# Patient Record
Sex: MALE | Race: WHITE | NOT HISPANIC OR LATINO | Employment: OTHER | URBAN - METROPOLITAN AREA
[De-identification: names, ages, dates, MRNs, and addresses within clinical notes are randomized per-mention and may not be internally consistent; named-entity substitution may affect disease eponyms.]

---

## 2021-10-29 LAB
BUN SERPL-MCNC: 27 MG/DL (ref 7–18)
CHOLEST SERPL-MCNC: 124 MG/DL
CREAT SERPL-MCNC: 1.56 MG/DL (ref 0.6–1.3)
GLUCOSE SERPL-MCNC: 104 MG/DL (ref 74–106)
HDLC SERPL-MCNC: 40 MG/DL (ref 35–60)
LDLC SERPL CALC-MCNC: 63 MG/DL
TRIGL SERPL-MCNC: 128 MG/DL (ref 30–150)

## 2022-04-07 ENCOUNTER — HISTORICAL (OUTPATIENT)
Dept: ADMINISTRATIVE | Facility: HOSPITAL | Age: 87
End: 2022-04-07
Payer: MEDICARE

## 2022-04-23 VITALS
OXYGEN SATURATION: 94 % | BODY MASS INDEX: 41.35 KG/M2 | SYSTOLIC BLOOD PRESSURE: 144 MMHG | WEIGHT: 263.44 LBS | HEIGHT: 67 IN | DIASTOLIC BLOOD PRESSURE: 75 MMHG

## 2022-05-03 ENCOUNTER — OFFICE VISIT (OUTPATIENT)
Dept: PRIMARY CARE CLINIC | Facility: CLINIC | Age: 87
End: 2022-05-03
Payer: MEDICARE

## 2022-05-03 VITALS
HEART RATE: 57 BPM | BODY MASS INDEX: 41.07 KG/M2 | OXYGEN SATURATION: 93 % | SYSTOLIC BLOOD PRESSURE: 168 MMHG | WEIGHT: 261.69 LBS | DIASTOLIC BLOOD PRESSURE: 74 MMHG

## 2022-05-03 DIAGNOSIS — E78.00 HYPERCHOLESTEROLEMIA: ICD-10-CM

## 2022-05-03 DIAGNOSIS — I10 HYPERTENSION, UNSPECIFIED TYPE: ICD-10-CM

## 2022-05-03 DIAGNOSIS — I48.20 CHRONIC ATRIAL FIBRILLATION: ICD-10-CM

## 2022-05-03 DIAGNOSIS — Z00.00 WELLNESS EXAMINATION: Primary | ICD-10-CM

## 2022-05-03 DIAGNOSIS — J30.89 NON-SEASONAL ALLERGIC RHINITIS, UNSPECIFIED TRIGGER: ICD-10-CM

## 2022-05-03 DIAGNOSIS — E87.6 HYPOKALEMIA: ICD-10-CM

## 2022-05-03 PROBLEM — M19.071 OSTEOARTHRITIS OF RIGHT ANKLE: Status: ACTIVE | Noted: 2022-05-03

## 2022-05-03 PROBLEM — E66.9 OBESITY: Status: ACTIVE | Noted: 2022-05-03

## 2022-05-03 PROBLEM — Z79.01 ON ANTICOAGULANT THERAPY: Status: ACTIVE | Noted: 2022-05-03

## 2022-05-03 PROBLEM — N40.0 BENIGN PROSTATIC HYPERPLASIA: Status: ACTIVE | Noted: 2022-05-03

## 2022-05-03 PROBLEM — M10.9 GOUT: Status: ACTIVE | Noted: 2022-05-03

## 2022-05-03 PROBLEM — I48.91 ATRIAL FIBRILLATION: Status: ACTIVE | Noted: 2022-05-03

## 2022-05-03 PROCEDURE — 99214 PR OFFICE/OUTPT VISIT, EST, LEVL IV, 30-39 MIN: ICD-10-PCS | Mod: ,,, | Performed by: FAMILY MEDICINE

## 2022-05-03 PROCEDURE — 99214 OFFICE O/P EST MOD 30 MIN: CPT | Mod: ,,, | Performed by: FAMILY MEDICINE

## 2022-05-03 RX ORDER — AMLODIPINE BESYLATE 5 MG/1
1 TABLET ORAL DAILY
COMMUNITY
Start: 2021-09-20 | End: 2022-08-02

## 2022-05-03 RX ORDER — CHLORTHALIDONE 25 MG/1
1 TABLET ORAL DAILY
COMMUNITY
Start: 2021-12-22 | End: 2023-03-15

## 2022-05-03 RX ORDER — FUROSEMIDE 20 MG/1
40 TABLET ORAL DAILY
COMMUNITY
Start: 2022-01-03

## 2022-05-03 RX ORDER — ZINC GLUCONATE 50 MG
1 TABLET ORAL 2 TIMES DAILY
COMMUNITY
Start: 2021-10-26

## 2022-05-03 RX ORDER — DEXAMETHASONE SODIUM PHOSPHATE 4 MG/ML
4 INJECTION, SOLUTION INTRA-ARTICULAR; INTRALESIONAL; INTRAMUSCULAR; INTRAVENOUS; SOFT TISSUE
Status: CANCELLED | OUTPATIENT
Start: 2022-05-03

## 2022-05-03 RX ORDER — MONTELUKAST SODIUM 10 MG/1
10 TABLET ORAL NIGHTLY
Qty: 30 TABLET | Refills: 0 | Status: SHIPPED | OUTPATIENT
Start: 2022-05-03 | End: 2022-06-02

## 2022-05-03 RX ORDER — ALBUTEROL SULFATE 0.83 MG/ML
5 SOLUTION RESPIRATORY (INHALATION) EVERY 6 HOURS PRN
COMMUNITY
Start: 2022-02-08

## 2022-05-03 RX ORDER — ATENOLOL 25 MG/1
1 TABLET ORAL DAILY
COMMUNITY
Start: 2022-03-08

## 2022-05-03 RX ORDER — FLUTICASONE PROPIONATE 50 MCG
2 SPRAY, SUSPENSION (ML) NASAL EVERY 12 HOURS
Qty: 16 ML | Refills: 3 | Status: SHIPPED | OUTPATIENT
Start: 2022-05-03 | End: 2022-08-02

## 2022-05-03 RX ORDER — ASPIRIN 81 MG/1
81 TABLET ORAL DAILY
COMMUNITY
Start: 2022-01-03

## 2022-05-03 RX ORDER — WARFARIN 10 MG/1
1 TABLET ORAL DAILY
COMMUNITY
Start: 2022-01-31

## 2022-05-03 RX ORDER — POTASSIUM CHLORIDE 750 MG/1
1 TABLET, EXTENDED RELEASE ORAL DAILY
COMMUNITY
Start: 2022-03-15

## 2022-05-03 RX ORDER — PRAVASTATIN SODIUM 40 MG/1
1 TABLET ORAL DAILY
COMMUNITY
Start: 2021-08-31

## 2022-05-03 RX ORDER — CEFUROXIME AXETIL 500 MG/1
1 TABLET ORAL DAILY
COMMUNITY
Start: 2022-02-14

## 2022-05-03 RX ORDER — BENAZEPRIL HYDROCHLORIDE 20 MG/1
1 TABLET ORAL 2 TIMES DAILY
COMMUNITY
Start: 2021-08-31 | End: 2023-03-15

## 2022-05-03 NOTE — PROGRESS NOTES
Subjective:       Patient ID: Ron Chao is a 87 y.o. male.    Chief Complaint: Follow-up (Need orders for new nebulizer)     Patient presents clinic for routine annual wellness visit.  Past medical history as noted below.  Blood pressure notably elevated in clinic at this time despite reported compliance to medication regimen as noted below.    PMH:  ADL assistance-needs help with meals and transportation and housework  Hypertension-benazepril 20 mg daily, chlorthalidone 25 mg daily with potassium chloride 40 mEq daily, atenolol 25 mg daily, amlodipine 5 mg daily  Hyperlipidemia-pravastatin 40 mg daily  AFib-Coumadin therapy with routine INR checks  Mild anemia-monitor to [facility]  Gout-unmedicated, FX left great toe with possibility of right ankle  Chronic right foot/ankle pain-topical pain medication as needed  CHF-questionable diagnosis based upon be in pea at prior hospital visit, beta-blocker, Ace inhibitor, dual diuretic therapy  BPH-monitored by Urology    Specialists:  Cardiology-Dr. Anjali WILL- Person in the past  Podiatry-previously Dr. Mijares  Orthopedic-Dr. Hansonurology-Dr. Guillen  Dermatology-Dr. Medina    Review of Systems   Constitutional: Negative for chills and fever.   HENT: Negative for nasal congestion and sore throat.    Eyes: Negative for redness and visual disturbance.   Respiratory: Negative for cough and wheezing.         Intermittent SOB with laying flat   Cardiovascular: Negative for chest pain and palpitations.   Gastrointestinal: Negative for nausea and vomiting.   Musculoskeletal: Positive for myalgias.        Chronic right ankle pain with bilateral lower ext swelling   Integumentary:  Negative for rash and wound.   Neurological: Negative for dizziness and weakness.   Psychiatric/Behavioral: Negative for confusion and sleep disturbance.         Objective:      Physical Exam  Constitutional:       Appearance: Normal appearance.   HENT:      Head: Normocephalic and  atraumatic.   Eyes:      General:         Right eye: No discharge.         Left eye: No discharge.      Conjunctiva/sclera: Conjunctivae normal.   Cardiovascular:      Rate and Rhythm: Normal rate and regular rhythm.   Pulmonary:      Effort: Pulmonary effort is normal.      Breath sounds: Normal breath sounds.   Abdominal:      Palpations: Abdomen is soft.      Tenderness: There is no abdominal tenderness.   Musculoskeletal:      Right lower leg: Edema present.      Left lower leg: Edema present.   Skin:     General: Skin is warm and dry.   Neurological:      General: No focal deficit present.      Mental Status: He is alert and oriented to person, place, and time.   Psychiatric:         Mood and Affect: Mood normal.         Behavior: Behavior normal.         Assessment:       Problem List Items Addressed This Visit        Cardiac/Vascular    Atrial fibrillation    Hypertension    Hypercholesterolemia      Other Visit Diagnoses     Wellness examination    -  Primary          Plan:       Continue current med regimen and follow up for routine visit

## 2022-05-31 ENCOUNTER — OFFICE VISIT (OUTPATIENT)
Dept: FAMILY MEDICINE | Facility: CLINIC | Age: 87
End: 2022-05-31
Payer: MEDICARE

## 2022-05-31 VITALS
BODY MASS INDEX: 40.02 KG/M2 | HEIGHT: 67 IN | RESPIRATION RATE: 18 BRPM | HEART RATE: 75 BPM | SYSTOLIC BLOOD PRESSURE: 152 MMHG | WEIGHT: 255 LBS | DIASTOLIC BLOOD PRESSURE: 98 MMHG | TEMPERATURE: 98 F | OXYGEN SATURATION: 96 %

## 2022-05-31 DIAGNOSIS — I10 HYPERTENSION, UNSPECIFIED TYPE: ICD-10-CM

## 2022-05-31 DIAGNOSIS — I50.9 CONGESTIVE HEART FAILURE, UNSPECIFIED HF CHRONICITY, UNSPECIFIED HEART FAILURE TYPE: Primary | ICD-10-CM

## 2022-05-31 PROCEDURE — 99213 PR OFFICE/OUTPT VISIT, EST, LEVL III, 20-29 MIN: ICD-10-PCS | Mod: ,,, | Performed by: NURSE PRACTITIONER

## 2022-05-31 PROCEDURE — 99213 OFFICE O/P EST LOW 20 MIN: CPT | Mod: ,,, | Performed by: NURSE PRACTITIONER

## 2022-05-31 RX ORDER — BUDESONIDE 0.5 MG/2ML
INHALANT ORAL
COMMUNITY
Start: 2022-05-21

## 2022-05-31 RX ORDER — MAG HYDROX/ALUMINUM HYD/SIMETH 400-400-40
1 SUSPENSION, ORAL (FINAL DOSE FORM) ORAL DAILY
COMMUNITY

## 2022-05-31 RX ORDER — NAPROXEN SODIUM 220 MG/1
1 TABLET ORAL DAILY
COMMUNITY

## 2022-05-31 RX ORDER — CHOLECALCIFEROL (VITAMIN D3) 125 MCG
4 CAPSULE ORAL DAILY PRN
COMMUNITY

## 2022-05-31 RX ORDER — TALC
1 POWDER (GRAM) TOPICAL DAILY PRN
COMMUNITY

## 2022-05-31 RX ORDER — BUDESONIDE 0.5 MG/2ML
0.5 INHALANT ORAL 2 TIMES DAILY
COMMUNITY
Start: 2022-05-26 | End: 2022-06-02

## 2022-05-31 NOTE — ASSESSMENT & PLAN NOTE
Elevated; Asymptomatic  Currently taking Benazepril 20 mg po BID, Atenolol 25 mg (1/2 tablet) daily, Amlodipine 5 mg po daily, and Lasix 20 mg po daily  Continue Benazepril, Atenolol, and Lasix  Increase Amlodipine to  5 mg (two tablets) daily  Daily BP check  Keep appt. With Cardiology on tomorrow; medication adjustments may be made  Daily BP log  F/U with PCP in 1 week  Report to ED for any CP, SOB, syncope, BP >200/100  Verbalizes understanding

## 2022-05-31 NOTE — ASSESSMENT & PLAN NOTE
Stable  Currently taking Atenolol 25 mg (1/2 tablet) daily, ACEi, and Lasix 20 mg po daily  Instructed to keep appt with Cardiologist on tomorrow for evaluation/possible medication changes  Report to ED for any CP, worsening SOB, and/or worsening symptoms  Verbalizes understanding

## 2022-05-31 NOTE — PROGRESS NOTES
Subjective:      Patient ID: Ron Chao is a 87 y.o. White male      Chief Complaint: Follow-up (Hospital f/u)       Past Medical History:   Diagnosis Date    BPH (benign prostatic hyperplasia)     CHF (congestive heart failure)     Gout, unspecified     Hx of long term use of blood thinners     Hypercholesterolemia     Hypertension     Hypertriglyceridemia     Paroxysmal atrial fibrillation         HPI  Presents for ED follow up.  Seen in ED with complaints of SOB.  Diagnosed with Bronchitis.  Discharged home with Rx for Z-stacey and Pulmicort neb solution.  States completion of z-stacey.  Since then, he has followed up with Pulmonologist on 5/26/2022.  Of note, over the past few months, patient has been admitted on several occasions with CHF exacerbations.  His Cardiologist is Dr. Alba; has appt on tomorrow for evaluation.      HTN:  BP elevated today.  Currently taking Benazepril 20 mg po BID, Atenolol 25 mg (1/2 tablet) daily, Amlodipine 5 mg po daily, and Lasix 20 mg po daily (Hx CHF).  Denies any headaches, syncope, dizziness, CP, or SOB.        Review of Systems   Constitutional: Negative for chills, fatigue, fever and unexpected weight change.   HENT: Negative.    Eyes: Negative.    Respiratory: Positive for shortness of breath (FARIAS). Negative for wheezing.    Cardiovascular: Negative.  Negative for chest pain.   Gastrointestinal: Negative.    Endocrine: Negative.    Genitourinary: Negative.    Musculoskeletal: Negative.    Integumentary:  Negative.   Allergic/Immunologic: Negative.    Neurological: Negative.  Negative for weakness.   Hematological: Negative.    Psychiatric/Behavioral: Negative.    All other systems reviewed and are negative.         Objective:      Vitals:    05/31/22 1321   BP: (!) 152/98   Pulse: 75   Resp:    Temp:       Body mass index is 40.02 kg/m².     Physical Exam  Vitals reviewed.   Constitutional:       Appearance: He is not toxic-appearing.   HENT:      Head:  Normocephalic.      Mouth/Throat:      Mouth: Mucous membranes are moist.   Eyes:      Extraocular Movements: Extraocular movements intact.      Pupils: Pupils are equal, round, and reactive to light.   Cardiovascular:      Rate and Rhythm: Normal rate and regular rhythm.      Pulses: Normal pulses.      Heart sounds: Normal heart sounds.   Pulmonary:      Effort: Pulmonary effort is normal. No respiratory distress.      Breath sounds: Normal breath sounds.   Abdominal:      General: Bowel sounds are normal. There is no distension.      Palpations: Abdomen is soft.      Tenderness: There is no abdominal tenderness.   Musculoskeletal:         General: No tenderness. Normal range of motion.      Cervical back: Neck supple.   Skin:     General: Skin is warm and dry.   Neurological:      Mental Status: He is alert and oriented to person, place, and time.   Psychiatric:         Mood and Affect: Mood normal.            Current Outpatient Medications:     albuterol (PROVENTIL) 2.5 mg /3 mL (0.083 %) nebulizer solution, Inhale 5 mg into the lungs every 6 (six) hours as needed., Disp: , Rfl:     amLODIPine (NORVASC) 5 MG tablet, Take 1 tablet by mouth once daily at 6am., Disp: , Rfl:     aspirin (ECOTRIN) 81 MG EC tablet, Take 81 mg by mouth once daily at 6am., Disp: , Rfl:     atenoloL (TENORMIN) 25 MG tablet, Take 1 tablet by mouth once daily. 1/2 tablet for 2 weeks until pt speaks w/ provider, Disp: , Rfl:     benazepriL (LOTENSIN) 20 MG tablet, Take 1 tablet by mouth 2 (two) times a day., Disp: , Rfl:     budesonide (PULMICORT) 0.5 mg/2 mL nebulizer solution, Inhale 0.5 mg into the lungs 2 (two) times a day., Disp: , Rfl:     budesonide (PULMICORT) 0.5 mg/2 mL nebulizer solution, TAKE 2 MLS VIA NEBULIZATION TWICE DAILY AS NEEDED FOR UP TO 7 DAYS, Disp: , Rfl:     cefUROXime (CEFTIN) 500 MG tablet, Take 1 tablet by mouth once daily., Disp: , Rfl:     chlorthalidone (HYGROTEN) 25 MG Tab, Take 1 tablet by mouth  once daily., Disp: , Rfl:     ferrous fumarate 325 mg (106 mg iron) Tab, Take 325 mg by mouth once daily., Disp: , Rfl:     fluticasone propionate (FLONASE) 50 mcg/actuation nasal spray, 2 sprays (100 mcg total) by Each Nostril route every 12 (twelve) hours., Disp: 16 mL, Rfl: 3    furosemide (LASIX) 20 MG tablet, Take 40 mg by mouth once daily., Disp: , Rfl:     melatonin (MELATIN) 3 mg tablet, Take 1 mg by mouth daily as needed., Disp: , Rfl:     montelukast (SINGULAIR) 10 mg tablet, Take 1 tablet (10 mg total) by mouth every evening., Disp: 30 tablet, Rfl: 0    naproxen sodium 220 mg Cap, Take 4 tablets by mouth daily as needed., Disp: , Rfl:     omega 3-dha-epa-fish oil 1,200 (144-216) mg Cap, Take 1 capsule by mouth once daily., Disp: , Rfl:     potassium chloride SA (K-DUR,KLOR-CON) 10 MEQ tablet, Take 1 tablet by mouth once daily., Disp: , Rfl:     pravastatin (PRAVACHOL) 40 MG tablet, Take 1 tablet by mouth once daily., Disp: , Rfl:     saw palmetto 450 mg Cap, Take 1 capsule by mouth once daily., Disp: , Rfl:     warfarin (COUMADIN) 10 MG tablet, Take 1 tablet by mouth once daily., Disp: , Rfl:     zinc gluconate 50 mg tablet, Take 1 tablet by mouth 2 (two) times a day., Disp: , Rfl:     Assessment & Plan:     Problem List Items Addressed This Visit        Cardiac/Vascular    Hypertension     Elevated; Asymptomatic  Currently taking Benazepril 20 mg po BID, Atenolol 25 mg (1/2 tablet) daily, Amlodipine 5 mg po daily, and Lasix 20 mg po daily  Continue Benazepril, Atenolol, and Lasix  Increase Amlodipine to  5 mg (two tablets) daily  Daily BP check  Keep appt. With Cardiology on tomorrow; medication adjustments may be made  Daily BP log  F/U with PCP in 1 week  Report to ED for any CP, SOB, syncope, BP >200/100  Verbalizes understanding               Congestive heart failure - Primary     Stable  Currently taking Atenolol 25 mg (1/2 tablet) daily, ACEi, and Lasix 20 mg po daily  Instructed to  keep appt with Cardiologist on tomorrow for evaluation/possible medication changes  Report to ED for any CP, worsening SOB, and/or worsening symptoms  Verbalizes understanding

## 2022-06-30 ENCOUNTER — OUTPATIENT CASE MANAGEMENT (OUTPATIENT)
Dept: ADMINISTRATIVE | Facility: OTHER | Age: 87
End: 2022-06-30
Payer: MEDICARE

## 2022-06-30 NOTE — PROGRESS NOTES
Pt declines participation in CM program, however I was able to refer him to [facility] for transportation barriers.

## 2022-08-02 ENCOUNTER — OFFICE VISIT (OUTPATIENT)
Dept: PRIMARY CARE CLINIC | Facility: CLINIC | Age: 87
End: 2022-08-02
Payer: MEDICARE

## 2022-08-02 VITALS
HEART RATE: 66 BPM | SYSTOLIC BLOOD PRESSURE: 167 MMHG | OXYGEN SATURATION: 95 % | BODY MASS INDEX: 40.49 KG/M2 | WEIGHT: 258 LBS | DIASTOLIC BLOOD PRESSURE: 89 MMHG

## 2022-08-02 DIAGNOSIS — I50.9 CONGESTIVE HEART FAILURE, UNSPECIFIED HF CHRONICITY, UNSPECIFIED HEART FAILURE TYPE: Primary | ICD-10-CM

## 2022-08-02 PROBLEM — Z00.00 WELLNESS EXAMINATION: Status: ACTIVE | Noted: 2022-08-02

## 2022-08-02 PROCEDURE — 99499 NO LOS: ICD-10-PCS | Mod: ,,, | Performed by: FAMILY MEDICINE

## 2022-08-02 PROCEDURE — 99499 UNLISTED E&M SERVICE: CPT | Mod: ,,, | Performed by: FAMILY MEDICINE

## 2022-08-02 RX ORDER — FLUTICASONE PROPIONATE 50 MCG
1 SPRAY, SUSPENSION (ML) NASAL DAILY
Qty: 16 G | Refills: 3 | Status: SHIPPED | OUTPATIENT
Start: 2022-08-02 | End: 2023-01-11

## 2022-08-02 RX ORDER — MONTELUKAST SODIUM 4 MG/1
4 TABLET, CHEWABLE ORAL NIGHTLY
Qty: 90 TABLET | Refills: 3 | Status: SHIPPED | OUTPATIENT
Start: 2022-08-02 | End: 2022-09-01

## 2022-08-02 RX ORDER — EMPAGLIFLOZIN 10 MG/1
10 TABLET, FILM COATED ORAL DAILY
Qty: 10 TABLET | Refills: 0 | Status: SHIPPED | OUTPATIENT
Start: 2022-08-02 | End: 2022-08-04

## 2022-08-02 RX ORDER — AMLODIPINE BESYLATE 10 MG/1
10 TABLET ORAL DAILY
Qty: 90 TABLET | Refills: 3 | Status: SHIPPED | OUTPATIENT
Start: 2022-08-02 | End: 2022-08-22 | Stop reason: SDUPTHER

## 2022-08-02 NOTE — ASSESSMENT & PLAN NOTE
Follow-up with cardiology for further evaluation and management with continuation of Ace inhibitor, diuretic, beta-blocker therapy with consideration for adjustment from atenolol to CHF approved beta-blocker in the future if inadequate improvement is noted, may consider Entresto in the future but at this time we will initiate GLP 1, Jardiance 10 mg daily

## 2022-08-02 NOTE — PROGRESS NOTES
Chief Complaint  Chief Complaint   Patient presents with    Follow-up       History of Present Illness  Patient presents clinic for after recent time visit.  The patient had 2 hospital visits over the past 6 weeks either which resulted in hospitalization but ER visits and was given diagnosis of bronchitis with prescription for cough suppressant and antibiotic.  At 1 of the visits lab work was performed which revealed a BNP elevated at 700.  The patient also had an elevated BNP performed 1 year ago for hospitalization but reports that he had seen Cardiology after this and had an echocardiogram which revealed no signs concerning for CHF at that time though this was 1 year prior.  The patient's blood pressure is notably elevated in clinic today but does improve upon manual recheck though still remains elevated and states that he is compliant with his current medication regimen but admits that he has been taking over-the-counter decongestants for management of postnasal drip with sore throat.  He had previously had improvement of this with Flonase and Singulair but has since run out of the prescription.    PMH:  ADL assistance-needs help with meals and transportation and housework  Hypertension-benazepril 20 mg BID, chlorthalidone 25 mg daily with potassium chloride 40 mEq daily, atenolol 12.5 mg daily, amlodipine 5 mg daily  Hyperlipidemia-pravastatin 40 mg daily  AFib-Coumadin therapy with routine INR checks  Mild anemia-monitor to [facility]  Gout-unmedicated, FX left great toe with possibility of right ankle  Chronic right foot/ankle pain-topical pain medication as needed  CHF-unconfirmed diagnosis based upon be in pea at prior hospital visit, beta-blocker, Ace inhibitor, dual diuretic therapy  BPH-monitored by Urology     Specialists:  Cardiology-Dr. Anjali WILL-Dr. Harry in the past  Podiatry-previously Dr. Mijares  Orthopedic-Dr. Hansonurology-Dr. Guillen  Dermatology-Dr. Medina    Review of  Systems  Review of Systems   Constitutional: Negative for activity change and unexpected weight change.   HENT: Negative for hearing loss, rhinorrhea and trouble swallowing.    Eyes: Negative for discharge and visual disturbance.   Respiratory: Positive for wheezing. Negative for chest tightness.    Cardiovascular: Negative for chest pain and palpitations.   Gastrointestinal: Negative for blood in stool, constipation, diarrhea and vomiting.   Endocrine: Negative for polydipsia and polyuria.   Genitourinary: Negative for difficulty urinating, hematuria and urgency.   Musculoskeletal: Positive for arthralgias and joint swelling. Negative for neck pain.   Neurological: Negative for weakness and headaches.   Psychiatric/Behavioral: Negative for confusion and dysphoric mood.       Physical Exam  Physical Exam  Constitutional:       Appearance: Normal appearance.   HENT:      Head: Normocephalic and atraumatic.   Eyes:      General: No scleral icterus.     Conjunctiva/sclera: Conjunctivae normal.   Cardiovascular:      Rate and Rhythm: Normal rate and regular rhythm.   Pulmonary:      Effort: Pulmonary effort is normal.      Breath sounds: Normal breath sounds.   Abdominal:      Palpations: Abdomen is soft.      Tenderness: There is no abdominal tenderness.   Musculoskeletal:         General: Swelling present. No deformity.   Skin:     General: Skin is warm and dry.   Neurological:      General: No focal deficit present.      Mental Status: He is alert and oriented to person, place, and time.      Comments: Antalgic gait   Psychiatric:         Mood and Affect: Mood normal.         Behavior: Behavior normal.         Problem List/Past Medical History  Past Medical History:   Diagnosis Date    BPH (benign prostatic hyperplasia)     CHF (congestive heart failure)     Gout, unspecified     Hx of long term use of blood thinners     Hypercholesterolemia     Hypertension     Hypertriglyceridemia     Paroxysmal atrial  fibrillation        Procedure/Surgical History  Past Surgical History:   Procedure Laterality Date    CARPAL TUNNEL RELEASE Left     COLONOSCOPY      rt ankle/foot fracture  1960       Medications  Current Outpatient Medications on File Prior to Visit   Medication Sig Dispense Refill    albuterol (PROVENTIL) 2.5 mg /3 mL (0.083 %) nebulizer solution Inhale 5 mg into the lungs every 6 (six) hours as needed.      atenoloL (TENORMIN) 25 MG tablet Take 1 tablet by mouth once daily. 1/2 tablet for 2 weeks until pt speaks w/ provider      benazepriL (LOTENSIN) 20 MG tablet Take 1 tablet by mouth 2 (two) times a day.      budesonide (PULMICORT) 0.5 mg/2 mL nebulizer solution TAKE 2 MLS VIA NEBULIZATION TWICE DAILY AS NEEDED FOR UP TO 7 DAYS      furosemide (LASIX) 20 MG tablet Take 40 mg by mouth once daily.      melatonin (MELATIN) 3 mg tablet Take 1 mg by mouth daily as needed.      potassium chloride SA (K-DUR,KLOR-CON) 10 MEQ tablet Take 1 tablet by mouth once daily.      pravastatin (PRAVACHOL) 40 MG tablet Take 1 tablet by mouth once daily.      saw palmetto 450 mg Cap Take 1 capsule by mouth once daily.      warfarin (COUMADIN) 10 MG tablet Take 1 tablet by mouth once daily.      zinc gluconate 50 mg tablet Take 1 tablet by mouth 2 (two) times a day.      [DISCONTINUED] amLODIPine (NORVASC) 5 MG tablet Take 1 tablet by mouth once daily at 6am.      [DISCONTINUED] fluticasone propionate (FLONASE) 50 mcg/actuation nasal spray 2 sprays (100 mcg total) by Each Nostril route every 12 (twelve) hours. 16 mL 3    aspirin (ECOTRIN) 81 MG EC tablet Take 81 mg by mouth once daily at 6am.      cefUROXime (CEFTIN) 500 MG tablet Take 1 tablet by mouth once daily.      chlorthalidone (HYGROTEN) 25 MG Tab Take 1 tablet by mouth once daily.      ferrous fumarate 325 mg (106 mg iron) Tab Take 325 mg by mouth once daily.      naproxen sodium 220 mg Cap Take 4 tablets by mouth daily as needed.      omega  3-dha-epa-fish oil 1,200 (144-216) mg Cap Take 1 capsule by mouth once daily.       No current facility-administered medications on file prior to visit.       Allegies  Review of patient's allergies indicates:   Allergen Reactions    Amlodipine Swelling    Simvastatin      Other reaction(s): muscle cramps        Social History  Social History     Socioeconomic History    Marital status:    Tobacco Use    Smoking status: Never Smoker    Smokeless tobacco: Never Used   Substance and Sexual Activity    Alcohol use: Not Currently    Drug use: Never       Family History  History reviewed. No pertinent family history.      Immunizations  Immunization History   Administered Date(s) Administered    COVID-19 Vaccine 10/27/2021    COVID-19, MRNA, LN-S, PF (MODERNA FULL 0.5 ML DOSE) 01/31/2021, 02/28/2021    COVID-19, rS-ChAdOx1, PF ([employer]) 10/27/2021    DTaP 07/21/2011    Influenza - High Dose - PF (65 years and older) 09/04/2013, 11/12/2015, 09/24/2016, 09/18/2017, 09/20/2018, 10/01/2019, 09/22/2021    Influenza - Quadrivalent - PF *Preferred* (6 months and older) 08/15/2020    Pneumococcal Conjugate - 13 Valent 07/22/2013, 10/01/2019, 10/01/2019    Pneumococcal Polysaccharide - 23 Valent 07/08/2011    Zoster 08/25/2018, 01/04/2019    Zoster Recombinant 08/25/2018, 01/04/2019       Health Maintenance  Health Maintenance   Topic Date Due    TETANUS VACCINE  Never done    Lipid Panel  05/18/2027        Assessment / Plan  Problem List Items Addressed This Visit        Cardiac/Vascular    Congestive heart failure - Primary    Current Assessment & Plan     Follow-up with cardiology for further evaluation and management with continuation of Ace inhibitor, diuretic, beta-blocker therapy with consideration for adjustment from atenolol to CHF approved beta-blocker in the future if inadequate improvement is noted, may consider Entresto in the future but at this time we will initiate GLP 1, Jardiance 10  mg daily                 RTC 2 months routine scheduled follow-up    Skip Melo

## 2022-08-04 ENCOUNTER — TELEPHONE (OUTPATIENT)
Dept: PRIMARY CARE CLINIC | Facility: CLINIC | Age: 87
End: 2022-08-04

## 2022-08-04 DIAGNOSIS — E11.9 TYPE 2 DIABETES MELLITUS WITHOUT COMPLICATION, WITH LONG-TERM CURRENT USE OF INSULIN: ICD-10-CM

## 2022-08-04 DIAGNOSIS — Z79.4 TYPE 2 DIABETES MELLITUS WITHOUT COMPLICATION, WITH LONG-TERM CURRENT USE OF INSULIN: Primary | ICD-10-CM

## 2022-08-04 DIAGNOSIS — I50.9 CONGESTIVE HEART FAILURE, UNSPECIFIED HF CHRONICITY, UNSPECIFIED HEART FAILURE TYPE: ICD-10-CM

## 2022-08-04 DIAGNOSIS — E11.9 TYPE 2 DIABETES MELLITUS WITHOUT COMPLICATION, WITH LONG-TERM CURRENT USE OF INSULIN: Primary | ICD-10-CM

## 2022-08-04 DIAGNOSIS — Z79.4 TYPE 2 DIABETES MELLITUS WITHOUT COMPLICATION, WITH LONG-TERM CURRENT USE OF INSULIN: ICD-10-CM

## 2022-08-04 NOTE — TELEPHONE ENCOUNTER
----- Message from Teresa Tomas sent at 8/4/2022  9:32 AM CDT -----  Regarding: Rx refill  Patient request the prescription for Jardiance be sent to [facility] again.

## 2022-08-22 DIAGNOSIS — I10 HYPERTENSION, UNSPECIFIED TYPE: Primary | ICD-10-CM

## 2022-08-22 RX ORDER — AMLODIPINE BESYLATE 10 MG/1
10 TABLET ORAL DAILY
Qty: 90 TABLET | Refills: 3 | Status: SHIPPED | OUTPATIENT
Start: 2022-08-22 | End: 2022-08-29 | Stop reason: SDUPTHER

## 2022-08-29 DIAGNOSIS — I10 HYPERTENSION, UNSPECIFIED TYPE: ICD-10-CM

## 2022-08-29 RX ORDER — AMLODIPINE BESYLATE 10 MG/1
10 TABLET ORAL DAILY
Qty: 90 TABLET | Refills: 3 | Status: SHIPPED | OUTPATIENT
Start: 2022-08-29 | End: 2022-08-29 | Stop reason: SDUPTHER

## 2022-08-29 RX ORDER — AMLODIPINE BESYLATE 10 MG/1
10 TABLET ORAL DAILY
Qty: 90 TABLET | Refills: 3 | Status: SHIPPED | OUTPATIENT
Start: 2022-08-29

## 2022-09-15 ENCOUNTER — HISTORICAL (OUTPATIENT)
Dept: ADMINISTRATIVE | Facility: HOSPITAL | Age: 87
End: 2022-09-15
Payer: MEDICARE

## 2022-11-07 PROBLEM — Z00.00 WELLNESS EXAMINATION: Status: RESOLVED | Noted: 2022-08-02 | Resolved: 2022-11-07

## 2022-11-09 ENCOUNTER — LAB VISIT (OUTPATIENT)
Dept: LAB | Facility: HOSPITAL | Age: 87
End: 2022-11-09
Attending: FAMILY MEDICINE
Payer: MEDICARE

## 2022-11-09 DIAGNOSIS — D64.9 ANEMIA, UNSPECIFIED TYPE: Primary | ICD-10-CM

## 2022-11-09 DIAGNOSIS — E87.6 HYPOKALEMIA: ICD-10-CM

## 2022-11-09 LAB
ALBUMIN SERPL-MCNC: 3.5 GM/DL (ref 3.4–4.8)
ALBUMIN/GLOB SERPL: 1.2 RATIO (ref 1.1–2)
ALP SERPL-CCNC: 53 UNIT/L (ref 40–150)
ALT SERPL-CCNC: 12 UNIT/L (ref 0–55)
AST SERPL-CCNC: 15 UNIT/L (ref 5–34)
BASOPHILS # BLD AUTO: 0.07 X10(3)/MCL (ref 0–0.2)
BASOPHILS NFR BLD AUTO: 0.9 %
BILIRUBIN DIRECT+TOT PNL SERPL-MCNC: 0.4 MG/DL
BUN SERPL-MCNC: 33.9 MG/DL (ref 8.4–25.7)
CALCIUM SERPL-MCNC: 9.1 MG/DL (ref 8.8–10)
CHLORIDE SERPL-SCNC: 107 MMOL/L (ref 98–107)
CO2 SERPL-SCNC: 28 MMOL/L (ref 23–31)
CREAT SERPL-MCNC: 1.77 MG/DL (ref 0.73–1.18)
EOSINOPHIL # BLD AUTO: 0.41 X10(3)/MCL (ref 0–0.9)
EOSINOPHIL NFR BLD AUTO: 5.2 %
ERYTHROCYTE [DISTWIDTH] IN BLOOD BY AUTOMATED COUNT: 18 % (ref 11.5–17)
FERRITIN SERPL-MCNC: 19.68 NG/ML (ref 21.81–274.66)
GFR SERPLBLD CREATININE-BSD FMLA CKD-EPI: 37 MLS/MIN/1.73/M2
GLOBULIN SER-MCNC: 3 GM/DL (ref 2.4–3.5)
GLUCOSE SERPL-MCNC: 97 MG/DL (ref 82–115)
HCT VFR BLD AUTO: 30.5 % (ref 42–52)
HGB BLD-MCNC: 8.9 GM/DL (ref 14–18)
IMM GRANULOCYTES # BLD AUTO: 0.03 X10(3)/MCL (ref 0–0.04)
IMM GRANULOCYTES NFR BLD AUTO: 0.4 %
IRON SATN MFR SERPL: 9 % (ref 20–50)
IRON SERPL-MCNC: 30 UG/DL (ref 65–175)
LYMPHOCYTES # BLD AUTO: 1.03 X10(3)/MCL (ref 0.6–4.6)
LYMPHOCYTES NFR BLD AUTO: 13.1 %
MCH RBC QN AUTO: 23.5 PG (ref 27–31)
MCHC RBC AUTO-ENTMCNC: 29.2 MG/DL (ref 33–36)
MCV RBC AUTO: 80.5 FL (ref 80–94)
MONOCYTES # BLD AUTO: 0.82 X10(3)/MCL (ref 0.1–1.3)
MONOCYTES NFR BLD AUTO: 10.4 %
NEUTROPHILS # BLD AUTO: 5.5 X10(3)/MCL (ref 2.1–9.2)
NEUTROPHILS NFR BLD AUTO: 70 %
NRBC BLD AUTO-RTO: 0 %
PLATELET # BLD AUTO: 234 X10(3)/MCL (ref 130–400)
PMV BLD AUTO: 9.4 FL (ref 7.4–10.4)
POTASSIUM SERPL-SCNC: 4.9 MMOL/L (ref 3.5–5.1)
PROT SERPL-MCNC: 6.5 GM/DL (ref 5.8–7.6)
RBC # BLD AUTO: 3.79 X10(6)/MCL (ref 4.7–6.1)
SODIUM SERPL-SCNC: 141 MMOL/L (ref 136–145)
TIBC SERPL-MCNC: 313 UG/DL (ref 69–240)
TIBC SERPL-MCNC: 343 UG/DL (ref 250–450)
WBC # SPEC AUTO: 7.9 X10(3)/MCL (ref 4.5–11.5)

## 2022-11-09 PROCEDURE — 36415 COLL VENOUS BLD VENIPUNCTURE: CPT

## 2022-11-09 PROCEDURE — 80053 COMPREHEN METABOLIC PANEL: CPT

## 2022-11-09 PROCEDURE — 83540 ASSAY OF IRON: CPT

## 2022-11-09 PROCEDURE — 85025 COMPLETE CBC W/AUTO DIFF WBC: CPT

## 2022-11-09 PROCEDURE — 82728 ASSAY OF FERRITIN: CPT

## 2022-11-15 ENCOUNTER — OFFICE VISIT (OUTPATIENT)
Dept: PRIMARY CARE CLINIC | Facility: CLINIC | Age: 87
End: 2022-11-15
Payer: MEDICARE

## 2022-11-15 VITALS
BODY MASS INDEX: 39.91 KG/M2 | OXYGEN SATURATION: 98 % | HEART RATE: 64 BPM | SYSTOLIC BLOOD PRESSURE: 136 MMHG | WEIGHT: 254.31 LBS | DIASTOLIC BLOOD PRESSURE: 72 MMHG

## 2022-11-15 DIAGNOSIS — N18.31 STAGE 3A CHRONIC KIDNEY DISEASE: Primary | ICD-10-CM

## 2022-11-15 DIAGNOSIS — Z00.00 WELLNESS EXAMINATION: ICD-10-CM

## 2022-11-15 DIAGNOSIS — I50.9 CONGESTIVE HEART FAILURE, UNSPECIFIED HF CHRONICITY, UNSPECIFIED HEART FAILURE TYPE: ICD-10-CM

## 2022-11-15 DIAGNOSIS — R73.02 IGT (IMPAIRED GLUCOSE TOLERANCE): ICD-10-CM

## 2022-11-15 DIAGNOSIS — D50.8 OTHER IRON DEFICIENCY ANEMIA: ICD-10-CM

## 2022-11-15 DIAGNOSIS — I48.20 CHRONIC ATRIAL FIBRILLATION: ICD-10-CM

## 2022-11-15 PROBLEM — D50.9 IRON DEFICIENCY ANEMIA: Status: ACTIVE | Noted: 2022-11-15

## 2022-11-15 PROCEDURE — G0439 PPPS, SUBSEQ VISIT: HCPCS | Mod: ,,, | Performed by: FAMILY MEDICINE

## 2022-11-15 PROCEDURE — G0439 PR MEDICARE ANNUAL WELLNESS SUBSEQUENT VISIT: ICD-10-PCS | Mod: ,,, | Performed by: FAMILY MEDICINE

## 2022-11-15 RX ORDER — SENNOSIDES 8.6 MG/1
1 TABLET ORAL DAILY
Qty: 90 TABLET | Refills: 3 | Status: SHIPPED | OUTPATIENT
Start: 2022-11-15

## 2022-11-15 NOTE — PROGRESS NOTES
Patient ID: [id number]     Chief Complaint: Medicare AWV        HPI:     Ron Chao is a 87 y.o. male here today for a Medicare Wellness.  Routine labs performed prior to this visit includes CBC with anemia showing hemoglobin and hematocrit at 8.9/30.5.  Chemistry panel reveals elevated creatinine at 1.77 without other overly concerning findings with iron profile showing suppressed iron at 30 with TIBC at upper range of normal at 343 with ferritin decreased at 19.68.  He had attempted oral iron in the past which caused constipation.  Tylenol as needed for pain he has no reported past history of chronic renal dysfunction but had routine lab work performed a year ago which also showed elevated creatinine at 1.56 indicating CKD.    PMH:  ADL assistance-needs help with meals and transportation and housework  Hypertension-benazepril 20 mg BID, chlorthalidone 25 mg daily with potassium chloride 40 mEq daily, atenolol 12.5 mg daily, amlodipine 5 mg daily  Hyperlipidemia-pravastatin 40 mg daily  AFib-Coumadin therapy with routine INR checks  Chronic iron def anemia-monitor to [facility]  Gout-unmedicated, FX left great toe with possibility of right ankle  Chronic right foot/ankle pain-topical pain medication as needed  CHF-unconfirmed diagnosis based upon elevated BNP at prior hospital visit, beta-blocker, Ace inhibitor, dual diuretic therapy  BPH-monitored by Urology  CKD     Specialists:  Cardiology-Dr. Anjali WILL-Dr. Harry in the past  Podiatry-previously Dr. Mijares  Orthopedic-Dr. Hansonurology-Dr. Guillen  Dermatology-Dr. Medina    A separate E/M code has been provided to evaluate additional complaints that the patient would like addressed during the dedicated Medicare Wellness Exam.        ----------------------------  BPH (benign prostatic hyperplasia)  CHF (congestive heart failure)  Gout, unspecified  Hx of long term use of blood  thinners  Hypercholesterolemia  Hypertension  Hypertriglyceridemia  Paroxysmal atrial fibrillation     Past Surgical History:   Procedure Laterality Date    CARPAL TUNNEL RELEASE Left     COLONOSCOPY      rt ankle/foot fracture  1960       Review of patient's allergies indicates:   Allergen Reactions    Simvastatin      Other reaction(s): muscle cramps       Outpatient Medications Marked as Taking for the 11/15/22 encounter (Office Visit) with Skip Melo MD   Medication Sig Dispense Refill    albuterol (PROVENTIL) 2.5 mg /3 mL (0.083 %) nebulizer solution Inhale 5 mg into the lungs every 6 (six) hours as needed.      amLODIPine (NORVASC) 10 MG tablet Take 1 tablet (10 mg total) by mouth once daily. 90 tablet 3    atenoloL (TENORMIN) 25 MG tablet Take 1 tablet by mouth once daily. 1/2 tablet for 2 weeks until pt speaks w/ provider      benazepriL (LOTENSIN) 20 MG tablet Take 1 tablet by mouth 2 (two) times a day.      budesonide (PULMICORT) 0.5 mg/2 mL nebulizer solution TAKE 2 MLS VIA NEBULIZATION TWICE DAILY AS NEEDED FOR UP TO 7 DAYS      empagliflozin (JARDIANCE) 10 mg tablet Take 1 tablet (10 mg total) by mouth once daily. 90 tablet 3    fluticasone propionate (FLONASE) 50 mcg/actuation nasal spray 1 spray (50 mcg total) by Each Nostril route once daily. 16 g 3    furosemide (LASIX) 20 MG tablet Take 40 mg by mouth once daily.      potassium chloride SA (K-DUR,KLOR-CON) 10 MEQ tablet Take 1 tablet by mouth once daily.      pravastatin (PRAVACHOL) 40 MG tablet Take 1 tablet by mouth once daily.      warfarin (COUMADIN) 10 MG tablet Take 1 tablet by mouth once daily.         Social History     Socioeconomic History    Marital status:    Tobacco Use    Smoking status: Never    Smokeless tobacco: Never   Substance and Sexual Activity    Alcohol use: Not Currently    Drug use: Never        No family history on file.     Patient Care Team:  Skip Melo MD as PCP - General (Family Medicine)        Subjective:     Review of Systems   Constitutional:  Negative for chills and fever.   HENT:  Negative for congestion and sore throat.    Respiratory:  Negative for cough and wheezing.    Cardiovascular:  Negative for chest pain and palpitations.   Gastrointestinal:  Negative for nausea and vomiting.   Genitourinary:  Negative for dysuria and urgency.   Musculoskeletal:  Positive for joint pain. Negative for myalgias.   Skin:  Negative for itching and rash.   Neurological:  Positive for weakness. Negative for dizziness.   Psychiatric/Behavioral:  Negative for depression. The patient is not nervous/anxious.        Patient Reported Health Risk Assessments:  What is your age?: 80 or older  Are you male or female?: Male  During the past four weeks, how much have you been bothered by emotional problems such as feeling anxious, depressed, irritable, sad, or downhearted and blue?: Not at all  During the past five weeks, has your physical and/or emotional health limited your social activities with family, friends, neighbors, or groups?: Not at all  During the past four weeks, how much bodily pain have you generally had?: Severe pain  During the past four weeks, was someone available to help if you needed and wanted help?: Yes, as much as I wanted  During the past four weeks, what was the hardest physical activity you could do for at least two minutes?: Very light  Can you get to places out of walking distance without help?  (For example, can you travel alone on buses or taxis, or drive your own car?): No  Can you go shopping for groceries or clothes without someone's help?: No  Can you prepare your own meals?: Yes  Can you do your own housework without help?: No  Because of any health problems, do you need the help of another person with your personal care needs such as eating, bathing, dressing, or getting around the house?: Yes  Can you handle your own money without help?: Yes  During the past four weeks, how would  you rate your health in general?: Good  How have things been going for you during the past four weeks?: Pretty well  Are you having difficulties driving your car?: No  Do you always fasten your seat belt when you are in a car?: Yes, usually  How often in the past four weeks have you been bothered by falling or dizzy when standing up?: Never  How often in the past four weeks have you been bothered by sexual problems?: Never  How often in the past four weeks have you been bothered by trouble eating well?: Never  How often in the past four weeks have you been bothered by teeth or denture problems?: Never  How often in the past four weeks have you been bothered with problems using the telephone?: Never  How often in the past four weeks have you been bothered by tiredness or fatigue?: Sometimes  Have you fallen two or more times in the past year?: No  Are you afraid of falling?: No  Are you a smoker?: No  During the past four weeks, how many drinks of wine, beer, or other alcoholic beverages did you have?: No alcohol at all  Do you exercise for about 20 minutes three or more days a week?: Yes, some of the time  Have you been given any information to help you with hazards in your house that might hurt you?: Yes  Have you been given any information to help you with keeping track of your medications?: Yes  How often do you have trouble taking medicines the way you've been told to take them?: I always take them as prescribed  How confident are you that you can control and manage most of your health problems?: Somewhat confident  What is your race? (Check all that apply.):     Objective:     /72 (BP Location: Right arm)   Pulse 64   Wt 115.3 kg (254 lb 4.8 oz)   SpO2 98%   BMI 39.91 kg/m²     Physical Exam  Constitutional:       Appearance: Normal appearance.   HENT:      Head: Normocephalic and atraumatic.   Eyes:      General:         Right eye: No discharge.         Left eye: No discharge.       Conjunctiva/sclera: Conjunctivae normal.   Cardiovascular:      Rate and Rhythm: Normal rate and regular rhythm.   Pulmonary:      Effort: Pulmonary effort is normal.      Breath sounds: Normal breath sounds.   Abdominal:      Palpations: Abdomen is soft.      Tenderness: There is no abdominal tenderness.   Skin:     General: Skin is warm and dry.   Neurological:      General: No focal deficit present.      Mental Status: He is alert and oriented to person, place, and time.   Psychiatric:         Mood and Affect: Mood normal.         Behavior: Behavior normal.         Assessment:       ICD-10-CM ICD-9-CM   1. Stage 3a chronic kidney disease  N18.31 585.3   2. Wellness examination  Z00.00 V70.0   3. Congestive heart failure, unspecified HF chronicity, unspecified heart failure type  I50.9 428.0   4. Chronic atrial fibrillation  I48.20 427.31   5. Other iron deficiency anemia  D50.8 280.8   6. IGT (impaired glucose tolerance)  R73.02 790.22        Plan:       Medicare Annual Wellness and Personalized Prevention Plan:     Fall Risk + Home Safety + Living Situation + Whisper Test + Depression Screen + CAGE + Cognitive Impairment Screen + ADL Screen + Timed Get Up and Go + Nutrition Screen + PAQ Screen + Health Risk Assessment all reviewed.     No flowsheet data found.  Fall Risk Assessment - Outpatient 11/15/2022 8/2/2022 5/31/2022 5/3/2022   Mobility Status Ambulatory w/ assistance Ambulatory Ambulatory w/ assistance Ambulatory w/ assistance   Number of falls 0 0 0 0   Identified as fall risk 0 0 0 0                   Depression Screening  Over the past two weeks, has the patient felt down, depressed, or hopeless?: No  Over the past two weeks, has the patient felt little interest or pleasure in doing things?: No  Functional Ability/Safety Screening  Was the patient's timed Up & Go test unsteady or longer than 30 seconds?: No  Does the patient need help with phone, transportation, shopping, preparing meals, housework,  laundry, meds, or managing money?: Yes  Does the patient's home have rugs in the hallway, lack grab bars in the bathroom, lack handrails on the stairs or have poor lighting?: No  Have you noticed any hearing difficulties?: No  Cognitive Function (Assessed through direct observation with due consideration of information obtained by way of patient reports and/or concerns raised by family, friends, caretakers, or others)    Does the patient repeat questions/statements in the same day?: No  Does the patient have trouble remembering the date, year, and time?: No  Does the patient have difficulty managing finances?: No  Does the patient have a decreased sense of direction?: No      Offer of free  was accepted or rejected?: rejected  If  rejected, why?: Patient states understands English    Alcohol/Tobacco Use - Stressed importance of smoking cessation and limiting alcohol intake.  CVD Risk Factors - Reviewed  Obesity/Physical Activity - Normal BMI. Encouraged daily 30 minute physical activity x 5 days per week.    Opioid Screening: Patient medication list reviewed, patient is not taking prescription opioids. Patient is not using additional opioids than prescribed. Patient is at low risk of substance abuse based on this opioid use history.       Health Maintenance Topics with due status: Not Due       Topic Last Completion Date    Lipid Panel 05/18/2022        AAA Screening - No prior screening. Will order US AAA for Welcome to Medicare.  Prostate Cancer Screening - No prior screening. Will order PSA today. 1/2018. Follow up annually.  Cervical Cancer Screening - Last Pap on - 1/2018. NIL. Follow up with GYN Clinic for Pap/Pelvic.   Breast Cancer Screening - Last Mammogram on 1/2018. Normal. Follow up in 1 year    Colon Cancer Screening - Colonoscopy on 1/2018. Follow up in 1 year . FIT Negative on 1/2018.  Osteoporosis Screening - Last DEXA on 1/2018. Results show Normal Bone Density.  Follow up in 1 year.  Eye Exam - Last Eye Exam - 1/2018.  Dental Exam - Recommend biannually  Vaccinations -   Immunization History   Administered Date(s) Administered    COVID-19 Vaccine 10/27/2021    COVID-19, MRNA, LN-S, PF (MODERNA FULL 0.5 ML DOSE) 01/31/2021, 02/28/2021    COVID-19, rS-ChAdOx1, PF (AstraZen[employer]) 10/27/2021    DTaP 07/21/2011    Influenza - High Dose - PF (65 years and older) 09/04/2013, 11/12/2015, 09/24/2016, 09/18/2017, 09/20/2018, 10/01/2019, 09/22/2021    Influenza - Quadrivalent - PF *Preferred* (6 months and older) 08/15/2020    Pneumococcal Conjugate - 13 Valent 07/22/2013, 10/01/2019, 10/01/2019    Pneumococcal Polysaccharide - 23 Valent 07/08/2011    Zoster 08/25/2018, 01/04/2019    Zoster Recombinant 08/25/2018, 01/04/2019        Advance Directives:   Living Will: No        Oral Declaration: No    [place]POST: No    Do Not Resuscitate Status: No    Medical Power of : No        Oral Declaration: No      Decision Making:  Patient answered questions  Goals of Care: The patient endorses that what is most important right now is to focus on quality of life, even if it means sacrificing a little time    Accordingly, we have decided that the best plan to meet the patient's goals includes continuing with treatment  Advance Care Planning   Advanced Care Planning: I offered to discuss Advanced Care Planning, which consists of how you would like to be cared for as you end the near of your natural life.  This includes how to pick a person who would make decisions for you if you were unable to make them for yourself, which is called a Medical Power of . These discussions include what kind of decisions you will make regarding life sustaining measures, such as ventilators and feeding tubes, when faced with a life limiting illness recorded on a living will that they will need to know. Advanced Directive discussion declined by patient.  Spent 20 minutes with the patient/family on the  importance of Advanced Care Planning.          1. Stage 3a chronic kidney disease  Overview:  Diagnosis based upon persistently elevated routine screening creatinine levels rising at this time.  Possibly TAYE on CKD.  Recommend avoidance of any nephrotoxic medications such as overuse of diuretics or NSAID use.  Recommend 1-2 L of fluid a day to avoid over-hydration due to CHF history and correction of iron deficiency anemia as detailed below      2. Wellness examination  Overview:  Discussed routine annual health maintenance and screening in addition to acute issues noted below.    Orders:  -     Comprehensive Metabolic Panel; Future; Expected date: 03/07/2023  -     Lipid Panel; Future; Expected date: 03/07/2023  -     Urinalysis; Future; Expected date: 03/07/2023    3. Congestive heart failure, unspecified HF chronicity, unspecified heart failure type  Overview:  Continue to follow-up with cardiology for routine monitoring and management of chronic condition based upon workup from prior hospitalization and treatment      4. Chronic atrial fibrillation  Overview:  Continue current treatment regimen with Coumadin therapy and routine INR checks and follow-up with Cardiology for routine monitoring and management      5. Other iron deficiency anemia  Overview:  Iron deficiency anemia with CKD history.  Recommend initiation of ferrous fumarate daily as prescribed and reassess levels at routine follow-up; if unable to tolerated may do felicit infusion    Orders:  -     Discontinue: ferrous fumarate 325 mg (106 mg iron) Tab; Take 325 mg by mouth once daily.  Dispense: 90 tablet; Refill: 3  -     senna (SENOKOT) 8.6 mg tablet; Take 1 tablet by mouth once daily. PRN constipation  Dispense: 90 tablet; Refill: 3  -     CBC Auto Differential; Future; Expected date: 03/07/2023  -     Iron and TIBC; Future; Expected date: 03/07/2023  -     Ferritin; Future; Expected date: 03/07/2023    6. IGT (impaired glucose tolerance)  -      Hemoglobin A1C; Future; Expected date: 03/07/2023         Medication List with Changes/Refills   New Medications    SENNA (SENOKOT) 8.6 MG TABLET    Take 1 tablet by mouth once daily. PRN constipation       Start Date: 11/15/2022End Date: --   Current Medications    ALBUTEROL (PROVENTIL) 2.5 MG /3 ML (0.083 %) NEBULIZER SOLUTION    Inhale 5 mg into the lungs every 6 (six) hours as needed.       Start Date: 2/8/2022  End Date: --    AMLODIPINE (NORVASC) 10 MG TABLET    Take 1 tablet (10 mg total) by mouth once daily.       Start Date: 8/29/2022 End Date: --    ASPIRIN (ECOTRIN) 81 MG EC TABLET    Take 81 mg by mouth once daily at 6am.       Start Date: 1/3/2022  End Date: --    ATENOLOL (TENORMIN) 25 MG TABLET    Take 1 tablet by mouth once daily. 1/2 tablet for 2 weeks until pt speaks w/ provider       Start Date: 3/8/2022  End Date: --    BENAZEPRIL (LOTENSIN) 20 MG TABLET    Take 1 tablet by mouth 2 (two) times a day.       Start Date: 8/31/2021 End Date: --    BUDESONIDE (PULMICORT) 0.5 MG/2 ML NEBULIZER SOLUTION    TAKE 2 MLS VIA NEBULIZATION TWICE DAILY AS NEEDED FOR UP TO 7 DAYS       Start Date: 5/21/2022 End Date: --    CEFUROXIME (CEFTIN) 500 MG TABLET    Take 1 tablet by mouth once daily.       Start Date: 2/14/2022 End Date: --    CHLORTHALIDONE (HYGROTEN) 25 MG TAB    Take 1 tablet by mouth once daily.       Start Date: 12/22/2021End Date: --    EMPAGLIFLOZIN (JARDIANCE) 10 MG TABLET    Take 1 tablet (10 mg total) by mouth once daily.       Start Date: 8/4/2022  End Date: --    FLUTICASONE PROPIONATE (FLONASE) 50 MCG/ACTUATION NASAL SPRAY    1 spray (50 mcg total) by Each Nostril route once daily.       Start Date: 8/2/2022  End Date: --    FUROSEMIDE (LASIX) 20 MG TABLET    Take 40 mg by mouth once daily.       Start Date: 1/3/2022  End Date: --    MELATONIN (MELATIN) 3 MG TABLET    Take 1 mg by mouth daily as needed.       Start Date: --        End Date: --    NAPROXEN SODIUM 220 MG CAP    Take 4  tablets by mouth daily as needed.       Start Date: --        End Date: --    OMEGA 3-DHA-EPA-FISH OIL 1,200 (144-216) MG CAP    Take 1 capsule by mouth once daily.       Start Date: --        End Date: --    POTASSIUM CHLORIDE SA (K-DUR,KLOR-CON) 10 MEQ TABLET    Take 1 tablet by mouth once daily.       Start Date: 3/15/2022 End Date: --    PRAVASTATIN (PRAVACHOL) 40 MG TABLET    Take 1 tablet by mouth once daily.       Start Date: 8/31/2021 End Date: --    SAW PALMETTO 450 MG CAP    Take 1 capsule by mouth once daily.       Start Date: --        End Date: --    WARFARIN (COUMADIN) 10 MG TABLET    Take 1 tablet by mouth once daily.       Start Date: 1/31/2022 End Date: --    ZINC GLUCONATE 50 MG TABLET    Take 1 tablet by mouth 2 (two) times a day.       Start Date: 10/26/2021End Date: --   Discontinued Medications    FERROUS FUMARATE 325 MG (106 MG IRON) TAB    Take 325 mg by mouth once daily.       Start Date: 12/17/2021End Date: 11/15/2022          The patient's Health Maintenance was reviewed and the following appears to be due at this time:   Health Maintenance Due   Topic Date Due    TETANUS VACCINE  Never done           Follow up in about 4 months (around 3/15/2023) for lab review. In addition to their scheduled follow up, the patient has also been instructed to follow up on as needed basis.     Provided patient with a 5-10 year written screening schedule and personal prevention plan. Recommendations were developed using the USPSTF age appropriate recommendations. Education, counseling, and referrals were provided as needed. After Visit Summary printed and given to patient which includes a list of additional screenings\tests needed.

## 2023-02-20 PROBLEM — Z00.00 WELLNESS EXAMINATION: Status: RESOLVED | Noted: 2022-08-02 | Resolved: 2023-02-20

## 2023-03-07 ENCOUNTER — LAB VISIT (OUTPATIENT)
Dept: LAB | Facility: HOSPITAL | Age: 88
End: 2023-03-07
Attending: FAMILY MEDICINE
Payer: MEDICARE

## 2023-03-07 DIAGNOSIS — D50.8 OTHER IRON DEFICIENCY ANEMIA: ICD-10-CM

## 2023-03-07 DIAGNOSIS — E78.5 HYPERLIPIDEMIA, UNSPECIFIED HYPERLIPIDEMIA TYPE: Primary | ICD-10-CM

## 2023-03-07 DIAGNOSIS — R73.02 IGT (IMPAIRED GLUCOSE TOLERANCE): ICD-10-CM

## 2023-03-07 DIAGNOSIS — E78.5 HYPERLIPIDEMIA, UNSPECIFIED HYPERLIPIDEMIA TYPE: ICD-10-CM

## 2023-03-07 DIAGNOSIS — Z00.00 WELLNESS EXAMINATION: ICD-10-CM

## 2023-03-07 LAB
ALBUMIN SERPL-MCNC: 3.6 G/DL (ref 3.4–4.8)
ALBUMIN/GLOB SERPL: 1.2 RATIO (ref 1.1–2)
ALP SERPL-CCNC: 52 UNIT/L (ref 40–150)
ALT SERPL-CCNC: 12 UNIT/L (ref 0–55)
AST SERPL-CCNC: 14 UNIT/L (ref 5–34)
BASOPHILS # BLD AUTO: 0.05 X10(3)/MCL (ref 0–0.2)
BASOPHILS NFR BLD AUTO: 0.7 %
BILIRUBIN DIRECT+TOT PNL SERPL-MCNC: 0.4 MG/DL
BUN SERPL-MCNC: 38.7 MG/DL (ref 8.4–25.7)
CALCIUM SERPL-MCNC: 9.2 MG/DL (ref 8.8–10)
CHLORIDE SERPL-SCNC: 108 MMOL/L (ref 98–107)
CHOLEST SERPL-MCNC: 170 MG/DL
CHOLEST/HDLC SERPL: 4 {RATIO} (ref 0–5)
CO2 SERPL-SCNC: 29 MMOL/L (ref 23–31)
CREAT SERPL-MCNC: 2.05 MG/DL (ref 0.73–1.18)
EOSINOPHIL # BLD AUTO: 0.44 X10(3)/MCL (ref 0–0.9)
EOSINOPHIL NFR BLD AUTO: 5.9 %
ERYTHROCYTE [DISTWIDTH] IN BLOOD BY AUTOMATED COUNT: 14.8 % (ref 11.5–17)
EST. AVERAGE GLUCOSE BLD GHB EST-MCNC: 105.4 MG/DL
FERRITIN SERPL-MCNC: 52.46 NG/ML (ref 21.81–274.66)
GFR SERPLBLD CREATININE-BSD FMLA CKD-EPI: 31 MLS/MIN/1.73/M2
GLOBULIN SER-MCNC: 3.1 GM/DL (ref 2.4–3.5)
GLUCOSE SERPL-MCNC: 102 MG/DL (ref 82–115)
HBA1C MFR BLD: 5.3 %
HCT VFR BLD AUTO: 40.5 % (ref 42–52)
HDLC SERPL-MCNC: 45 MG/DL (ref 35–60)
HGB BLD-MCNC: 12.9 G/DL (ref 14–18)
IMM GRANULOCYTES # BLD AUTO: 0.01 X10(3)/MCL (ref 0–0.04)
IMM GRANULOCYTES NFR BLD AUTO: 0.1 %
IRON SATN MFR SERPL: 36 % (ref 20–50)
IRON SERPL-MCNC: 114 UG/DL (ref 65–175)
LDLC SERPL CALC-MCNC: 85 MG/DL (ref 50–140)
LYMPHOCYTES # BLD AUTO: 1.02 X10(3)/MCL (ref 0.6–4.6)
LYMPHOCYTES NFR BLD AUTO: 13.6 %
MCH RBC QN AUTO: 29.1 PG
MCHC RBC AUTO-ENTMCNC: 31.9 G/DL (ref 33–36)
MCV RBC AUTO: 91.2 FL (ref 80–94)
MONOCYTES # BLD AUTO: 0.82 X10(3)/MCL (ref 0.1–1.3)
MONOCYTES NFR BLD AUTO: 11 %
NEUTROPHILS # BLD AUTO: 5.14 X10(3)/MCL (ref 2.1–9.2)
NEUTROPHILS NFR BLD AUTO: 68.7 %
NRBC BLD AUTO-RTO: 0 %
PLATELET # BLD AUTO: 230 X10(3)/MCL (ref 130–400)
PMV BLD AUTO: 9.8 FL (ref 7.4–10.4)
POTASSIUM SERPL-SCNC: 4.6 MMOL/L (ref 3.5–5.1)
PROT SERPL-MCNC: 6.7 GM/DL (ref 5.8–7.6)
RBC # BLD AUTO: 4.44 X10(6)/MCL (ref 4.7–6.1)
SODIUM SERPL-SCNC: 143 MMOL/L (ref 136–145)
TIBC SERPL-MCNC: 199 UG/DL (ref 69–240)
TIBC SERPL-MCNC: 313 UG/DL (ref 250–450)
TRANSFERRIN SERPL-MCNC: 278 MG/DL
TRIGL SERPL-MCNC: 199 MG/DL (ref 34–140)
VLDLC SERPL CALC-MCNC: 40 MG/DL
WBC # SPEC AUTO: 7.5 X10(3)/MCL (ref 4.5–11.5)

## 2023-03-07 PROCEDURE — 83036 HEMOGLOBIN GLYCOSYLATED A1C: CPT

## 2023-03-07 PROCEDURE — 85025 COMPLETE CBC W/AUTO DIFF WBC: CPT

## 2023-03-07 PROCEDURE — 83550 IRON BINDING TEST: CPT

## 2023-03-07 PROCEDURE — 80061 LIPID PANEL: CPT

## 2023-03-07 PROCEDURE — 82728 ASSAY OF FERRITIN: CPT

## 2023-03-07 PROCEDURE — 36415 COLL VENOUS BLD VENIPUNCTURE: CPT

## 2023-03-07 PROCEDURE — 80053 COMPREHEN METABOLIC PANEL: CPT

## 2023-03-15 ENCOUNTER — TELEPHONE (OUTPATIENT)
Dept: PRIMARY CARE CLINIC | Facility: CLINIC | Age: 88
End: 2023-03-15

## 2023-03-15 ENCOUNTER — OFFICE VISIT (OUTPATIENT)
Dept: PRIMARY CARE CLINIC | Facility: CLINIC | Age: 88
End: 2023-03-15
Payer: MEDICARE

## 2023-03-15 VITALS
OXYGEN SATURATION: 97 % | SYSTOLIC BLOOD PRESSURE: 152 MMHG | HEART RATE: 64 BPM | BODY MASS INDEX: 40.23 KG/M2 | DIASTOLIC BLOOD PRESSURE: 80 MMHG | WEIGHT: 256.31 LBS

## 2023-03-15 DIAGNOSIS — E66.01 MORBID (SEVERE) OBESITY DUE TO EXCESS CALORIES: ICD-10-CM

## 2023-03-15 DIAGNOSIS — I10 HYPERTENSION, UNSPECIFIED TYPE: ICD-10-CM

## 2023-03-15 DIAGNOSIS — D50.8 OTHER IRON DEFICIENCY ANEMIA: ICD-10-CM

## 2023-03-15 DIAGNOSIS — I10 HYPERTENSION, UNSPECIFIED TYPE: Primary | ICD-10-CM

## 2023-03-15 DIAGNOSIS — N18.31 STAGE 3A CHRONIC KIDNEY DISEASE: Primary | ICD-10-CM

## 2023-03-15 PROCEDURE — 99214 PR OFFICE/OUTPT VISIT, EST, LEVL IV, 30-39 MIN: ICD-10-PCS | Mod: ,,, | Performed by: FAMILY MEDICINE

## 2023-03-15 PROCEDURE — 99214 OFFICE O/P EST MOD 30 MIN: CPT | Mod: ,,, | Performed by: FAMILY MEDICINE

## 2023-03-15 RX ORDER — WARFARIN 7.5 MG/1
7.5 TABLET ORAL DAILY
COMMUNITY

## 2023-03-15 RX ORDER — VALSARTAN 160 MG/1
160 TABLET ORAL 2 TIMES DAILY
Qty: 180 TABLET | Refills: 3 | Status: SHIPPED | OUTPATIENT
Start: 2023-03-15 | End: 2023-03-15 | Stop reason: SDUPTHER

## 2023-03-15 RX ORDER — WARFARIN SODIUM 5 MG/1
5 TABLET ORAL
COMMUNITY

## 2023-03-15 RX ORDER — FERROUS FUMARATE 324(106)MG
TABLET ORAL
COMMUNITY
Start: 2023-01-26

## 2023-03-15 RX ORDER — MONTELUKAST SODIUM 4 MG/1
1 TABLET, CHEWABLE ORAL NIGHTLY
COMMUNITY

## 2023-03-15 RX ORDER — VALSARTAN 160 MG/1
160 TABLET ORAL 2 TIMES DAILY
Qty: 180 TABLET | Refills: 3 | Status: SHIPPED | OUTPATIENT
Start: 2023-03-15 | End: 2024-03-14

## 2023-03-15 NOTE — PROGRESS NOTES
Chief Complaint  Chief Complaint   Patient presents with    Follow-up     With labs       History of Present Illness  Routine labs performed prior to this visit includes CBC with anemia showing hemoglobin and hematocrit at 12.9/40.5 which is notably improved from previous check of 8.9/30.5 four months prior.  Chemistry panel reveals elevated creatinine at 2.05 with GFR at 31 which is increased from previous creatinine at 1.77 without other overly concerning findings with iron profile with resolution of prior iron deficiency since supplementation was initiated at last visit.  FLP performed with LDL at 85 with triglycerides at 199 with hemoglobin A1c at 5.3%.      PMH:  ADL assistance-needs help with meals and transportation and housework  Hypertension-benazepril 20 mg BID, chlorthalidone 25 mg daily with potassium chloride 40 mEq daily, atenolol 12.5 mg daily, amlodipine 5 mg daily  Hyperlipidemia-pravastatin 40 mg daily  AFib-Coumadin therapy with routine INR checks  Chronic iron def anemia-monitor to [facility]  Gout-unmedicated, FX left great toe with possibility of right ankle  Chronic right foot/ankle pain-topical pain medication as needed  CHF-unconfirmed diagnosis based upon elevated BNP at prior hospital visit, beta-blocker, Ace inhibitor, dual diuretic therapy  BPH-monitored by Urology  CKD stage 3b     Specialists:  Cardiology-Dr. Anjali WILL- Person in the past  Podiatry-previously Dr. Mijares  Orthopedic-Dr. Hansonurology-Dr. Guillen  Dermatology-Dr. Medina  Review of Systems  Review of Systems   Constitutional:  Negative for activity change and unexpected weight change.   HENT:  Negative for hearing loss, rhinorrhea and trouble swallowing.    Eyes:  Negative for discharge and visual disturbance.   Respiratory:  Negative for chest tightness and wheezing.    Cardiovascular:  Negative for chest pain and palpitations.   Gastrointestinal:  Negative for blood in stool, constipation, diarrhea and  vomiting.   Endocrine: Negative for polydipsia and polyuria.   Genitourinary:  Negative for difficulty urinating, hematuria and urgency.   Musculoskeletal:  Positive for arthralgias and joint swelling. Negative for neck pain.   Neurological:  Negative for weakness and headaches.   Psychiatric/Behavioral:  Negative for confusion and dysphoric mood.      Physical Exam  Physical Exam  Constitutional:       Appearance: Normal appearance.   HENT:      Head: Normocephalic and atraumatic.   Eyes:      General: No scleral icterus.     Conjunctiva/sclera: Conjunctivae normal.   Cardiovascular:      Rate and Rhythm: Normal rate and regular rhythm.   Pulmonary:      Effort: Pulmonary effort is normal.      Breath sounds: Normal breath sounds.   Abdominal:      Palpations: Abdomen is soft.      Tenderness: There is no abdominal tenderness.   Musculoskeletal:         General: Swelling present. No deformity.   Skin:     General: Skin is warm and dry.   Neurological:      General: No focal deficit present.      Mental Status: He is alert and oriented to person, place, and time.      Comments: Antalgic gait   Psychiatric:         Mood and Affect: Mood normal.         Behavior: Behavior normal.       Problem List/Past Medical History  Past Medical History:   Diagnosis Date    BPH (benign prostatic hyperplasia)     CHF (congestive heart failure)     Gout, unspecified     Hx of long term use of blood thinners     Hypercholesterolemia     Hypertension     Hypertriglyceridemia     Paroxysmal atrial fibrillation        Procedure/Surgical History  Past Surgical History:   Procedure Laterality Date    CARPAL TUNNEL RELEASE Left     COLONOSCOPY      rt ankle/foot fracture  1960       Medications  Current Outpatient Medications on File Prior to Visit   Medication Sig Dispense Refill    albuterol (PROVENTIL) 2.5 mg /3 mL (0.083 %) nebulizer solution Inhale 5 mg into the lungs every 6 (six) hours as needed.      amLODIPine (NORVASC) 10 MG  tablet Take 1 tablet (10 mg total) by mouth once daily. (Patient taking differently: Take 5 mg by mouth once daily.) 90 tablet 3    atenoloL (TENORMIN) 25 MG tablet Take 1 tablet by mouth once daily. 1/2 tablet for 2 weeks until pt speaks w/ provider      budesonide (PULMICORT) 0.5 mg/2 mL nebulizer solution TAKE 2 MLS VIA NEBULIZATION TWICE DAILY AS NEEDED FOR UP TO 7 DAYS      empagliflozin (JARDIANCE) 10 mg tablet Take 1 tablet (10 mg total) by mouth once daily. 90 tablet 3    FERRETTS 325 mg (106 mg iron) Tab       fluticasone propionate (FLONASE) 50 mcg/actuation nasal spray USE 1 SPRAY IN EACH NOSTRIL ONE TIME DAILY 32 g 3    furosemide (LASIX) 20 MG tablet Take 40 mg by mouth once daily.      montelukast 4 MG chewable tablet Take 1 tablet by mouth every evening.      potassium chloride SA (K-DUR,KLOR-CON) 10 MEQ tablet Take 1 tablet by mouth once daily.      pravastatin (PRAVACHOL) 40 MG tablet Take 1 tablet by mouth once daily.      warfarin (COUMADIN) 5 MG tablet Take 5 mg by mouth every Saturday.      warfarin (COUMADIN) 7.5 MG tablet Take 7.5 mg by mouth once daily. Daily except Saturday      [DISCONTINUED] benazepriL (LOTENSIN) 20 MG tablet Take 1 tablet by mouth 2 (two) times a day.      aspirin (ECOTRIN) 81 MG EC tablet Take 81 mg by mouth once daily at 6am.      cefUROXime (CEFTIN) 500 MG tablet Take 1 tablet by mouth once daily.      melatonin (MELATIN) 3 mg tablet Take 1 mg by mouth daily as needed.      naproxen sodium 220 mg Cap Take 4 tablets by mouth daily as needed.      omega 3-dha-epa-fish oil 1,200 (144-216) mg Cap Take 1 capsule by mouth once daily.      saw palmetto 450 mg Cap Take 1 capsule by mouth once daily.      senna (SENOKOT) 8.6 mg tablet Take 1 tablet by mouth once daily. PRN constipation (Patient not taking: Reported on 3/15/2023) 90 tablet 3    warfarin (COUMADIN) 10 MG tablet Take 1 tablet by mouth once daily.      zinc gluconate 50 mg tablet Take 1 tablet by mouth 2 (two)  times a day.      [DISCONTINUED] chlorthalidone (HYGROTEN) 25 MG Tab Take 1 tablet by mouth once daily.       No current facility-administered medications on file prior to visit.       Allegies  Review of patient's allergies indicates:   Allergen Reactions    Simvastatin      Other reaction(s): muscle cramps        Social History  Social History     Socioeconomic History    Marital status:    Tobacco Use    Smoking status: Never    Smokeless tobacco: Never   Substance and Sexual Activity    Alcohol use: Not Currently    Drug use: Never    Sexual activity: Not Currently       Family History  History reviewed. No pertinent family history.      Immunizations  Immunization History   Administered Date(s) Administered    COVID-19 Vaccine 10/27/2021    COVID-19, MRNA, LN-S, PF (MODERNA FULL 0.5 ML DOSE) 01/31/2021, 02/28/2021    COVID-19, rS-ChAdOx1, PF (AstraZen[employer]) 10/27/2021    DTaP 07/21/2011    Influenza - High Dose - PF (65 years and older) 09/04/2013, 11/12/2015, 09/24/2016, 09/18/2017, 09/20/2018, 10/01/2019, 09/22/2021    Influenza - Quadrivalent - PF *Preferred* (6 months and older) 08/15/2020    Pneumococcal Conjugate - 13 Valent 07/22/2013, 10/01/2019, 10/01/2019    Pneumococcal Polysaccharide - 23 Valent 07/08/2011    Zoster 08/25/2018, 01/04/2019    Zoster Recombinant 08/25/2018, 01/04/2019       Health Maintenance  Health Maintenance   Topic Date Due    TETANUS VACCINE  Never done    Lipid Panel  03/07/2028        Assessment / Plan  Problem List Items Addressed This Visit          Cardiac/Vascular    Hypertension    Overview     Blood pressure goal <140/90 (<130/80 if otherwise noted), recommend DASH diet, record BP at home daily and bring log to next office visit to assure that home cuff is calibrated at minimum every 12 months            Renal/    Stage 3a chronic kidney disease - Primary    Overview     Stable renal indices.  No significant proteinuria.  Continue risk factor management and  periodic monitoring with renal sparing activities including:  -Follow low sodium diet (2 grams a day)   -Control high blood pressure ( goal BP < 130/80, please record BP at home every day and bring log to next office visit to assure that home cuff is calibrated at minimum every 12 months)   -Exercise at least 30 minutes a day, 5 days a week.   -Maintain healthy weight.   -Stay well hydrated   -Receive Pneumovax, Flu, and HBV vaccines if indicated.   -Do not take NSAIDs (Ibuprofen, Naproxen, Aleve, Advil, Toradol, Mobic), may take only Tylenol as needed for pain/headaches.   -Take cholesterol-lowering medications as prescribed (LDL goal <100)    Because of progressively worsening renal function we will switch benazepril 20 mg twice daily to valsartan 160 mg twice daily and recommend avoidance of daily Lasix but use Lasix 20 mg now only as needed for swelling or signs of fluid overload.            Oncology    Iron deficiency anemia    Overview     Iron deficiency anemia with CKD history.  Recommend continuation of previously she ended ferrous fumarate daily as prescribed and reassess levels at routine follow-up            Endocrine    Morbid (severe) obesity due to excess calories     RTC 6 weeks    Skip Melo

## 2023-03-16 LAB
APPEARANCE UR: ABNORMAL
BACTERIA #/AREA URNS AUTO: ABNORMAL /HPF
BILIRUB UR QL STRIP.AUTO: NEGATIVE MG/DL
COLOR UR AUTO: YELLOW
GLUCOSE UR QL STRIP.AUTO: ABNORMAL MG/DL
KETONES UR QL STRIP.AUTO: NEGATIVE MG/DL
LEUKOCYTE ESTERASE UR QL STRIP.AUTO: NEGATIVE UNIT/L
NITRITE UR QL STRIP.AUTO: NEGATIVE
PH UR STRIP.AUTO: 6.5 [PH]
PROT UR QL STRIP.AUTO: ABNORMAL MG/DL
RBC #/AREA URNS AUTO: 7 /HPF
RBC UR QL AUTO: ABNORMAL UNIT/L
SP GR UR STRIP.AUTO: 1.02 (ref 1–1.03)
SQUAMOUS #/AREA URNS AUTO: <5 /HPF
UROBILINOGEN UR STRIP-ACNC: 0.2 MG/DL
WBC #/AREA URNS AUTO: <5 /HPF

## 2023-03-22 ENCOUNTER — TELEPHONE (OUTPATIENT)
Dept: PRIMARY CARE CLINIC | Facility: CLINIC | Age: 88
End: 2023-03-22
Payer: MEDICARE

## 2023-03-23 ENCOUNTER — TELEPHONE (OUTPATIENT)
Dept: PRIMARY CARE CLINIC | Facility: CLINIC | Age: 88
End: 2023-03-23
Payer: MEDICARE

## 2023-03-23 NOTE — TELEPHONE ENCOUNTER
Urine results do show bacteria present but we were awaiting the culture however at over 5 days culture has still not grown specific organisms.  This could mean that the bacteria present is suman however is he having any symptoms?  If at all symptomatic I would want to prescribe antibiotics regardless of whether or not culture grows

## 2023-04-03 ENCOUNTER — TELEPHONE (OUTPATIENT)
Dept: PRIMARY CARE CLINIC | Facility: CLINIC | Age: 88
End: 2023-04-03
Payer: MEDICARE

## 2023-04-04 RX ORDER — BENZONATATE 200 MG/1
200 CAPSULE ORAL 3 TIMES DAILY PRN
Qty: 30 CAPSULE | Refills: 0 | Status: SHIPPED | OUTPATIENT
Start: 2023-04-04 | End: 2023-04-14

## 2023-04-11 ENCOUNTER — EXTERNAL HOSPITAL ADMISSION (OUTPATIENT)
Dept: ADMINISTRATIVE | Facility: CLINIC | Age: 88
End: 2023-04-11
Payer: MEDICARE

## 2023-04-11 ENCOUNTER — PATIENT OUTREACH (OUTPATIENT)
Dept: ADMINISTRATIVE | Facility: CLINIC | Age: 88
End: 2023-04-11
Payer: MEDICARE

## 2023-04-12 ENCOUNTER — TELEPHONE (OUTPATIENT)
Dept: PRIMARY CARE CLINIC | Facility: CLINIC | Age: 88
End: 2023-04-12
Payer: MEDICARE

## 2023-04-12 NOTE — TELEPHONE ENCOUNTER
Pt called yesterday condition got worse advised him to seek help, he said he was going to cardio dr. Just talked to him he is indeed in hospital, will check up on him before end of week.

## 2023-04-13 NOTE — TELEPHONE ENCOUNTER
Pt has been in the hospital twice now. 04/06/23- 04/11/23 (Congestive heart failure & chronic SOB). Pt's son voiced he is seeing a kidney doctor and blood work was done to follow up for kidneys. Pt's son would like to know does his dad need more blood work or are the results from hospital enough?   Please advise, thanks.

## 2023-04-13 NOTE — TELEPHONE ENCOUNTER
----- Message from Mel Chao sent at 4/13/2023  1:09 PM CDT -----  Regarding: medical advice  Type:  Needs Medical Advice    Who Called: pt's son (Mando)    Would the patient rather a call back or a response via [facility]? Call back  Best Call Back Number: [phone]  Additional Information: pt's son would like to speak to [facility] concerning his dad.  He stated his dad is in the hospital and he would like to discuss some labs and his dad's next visit.  Please call when availble

## 2023-04-21 ENCOUNTER — TELEPHONE (OUTPATIENT)
Dept: PRIMARY CARE CLINIC | Facility: CLINIC | Age: 88
End: 2023-04-21
Payer: MEDICARE

## 2023-04-21 NOTE — TELEPHONE ENCOUNTER
Talked to isabelle his son he is canceling his upcoming appt due to mr murguia going to LTAC at [facility] today, he is slowly getting better. He is leaving the heart hospital  today.     ----- Message from Aracelis De Dios sent at 4/21/2023  9:24 AM CDT -----  Regarding: return call  Type:  Patient Returning Call    Who Called:pt's son Mando  Who Left Message for Patient:  Does the patient know what this is regarding?:  Would the patient rather a call back or a response via [facility]? C/b  Best Call Back Number:[phone]  Additional Information: